# Patient Record
Sex: FEMALE | Race: WHITE | Employment: FULL TIME | URBAN - METROPOLITAN AREA
[De-identification: names, ages, dates, MRNs, and addresses within clinical notes are randomized per-mention and may not be internally consistent; named-entity substitution may affect disease eponyms.]

---

## 2017-06-12 RX ORDER — LEVONORGESTREL AND ETHINYL ESTRADIOL 0.1-0.02MG
1 KIT ORAL DAILY
Qty: 1 PACKAGE | Refills: 0 | Status: SHIPPED | OUTPATIENT
Start: 2017-06-12 | End: 2017-06-13

## 2017-06-13 ENCOUNTER — TELEPHONE (OUTPATIENT)
Dept: OBGYN CLINIC | Facility: CLINIC | Age: 37
End: 2017-06-13

## 2017-06-13 RX ORDER — LEVONORGESTREL AND ETHINYL ESTRADIOL 0.1-0.02MG
1 KIT ORAL DAILY
Qty: 1 PACKAGE | Refills: 2 | Status: SHIPPED | OUTPATIENT
Start: 2017-06-13 | End: 2017-07-24

## 2017-06-13 NOTE — TELEPHONE ENCOUNTER
Refill request for OCP sent faxed to the office. Pt's last annual exam was 01/04/2016. Pt needs annual exam. LM on voicemail that unable to refill rx at this time but to please call the office and make annual appointment.

## 2017-06-13 NOTE — TELEPHONE ENCOUNTER
Medication Detail         Disp Refills Start End        Levonorgestrel-Ethinyl Estrad (LUTERA) 0.1-20 MG-MCG Oral Tab 1 Package 2 6/13/2017       Sig - Route:  Take 1 tablet by mouth daily. - Oral      E-Prescribing Status: Receipt confirmed by pharmacy (6/

## 2017-07-24 ENCOUNTER — OFFICE VISIT (OUTPATIENT)
Dept: OBGYN CLINIC | Facility: CLINIC | Age: 37
End: 2017-07-24

## 2017-07-24 VITALS
WEIGHT: 246.81 LBS | DIASTOLIC BLOOD PRESSURE: 80 MMHG | SYSTOLIC BLOOD PRESSURE: 120 MMHG | HEIGHT: 65 IN | BODY MASS INDEX: 41.12 KG/M2

## 2017-07-24 DIAGNOSIS — E66.01 OBESITY, CLASS III, BMI 40-49.9 (MORBID OBESITY) (HCC): ICD-10-CM

## 2017-07-24 DIAGNOSIS — Z30.41 ENCOUNTER FOR SURVEILLANCE OF CONTRACEPTIVE PILLS: ICD-10-CM

## 2017-07-24 DIAGNOSIS — Z01.419 WOMEN'S ANNUAL ROUTINE GYNECOLOGICAL EXAMINATION: Primary | ICD-10-CM

## 2017-07-24 PROCEDURE — 99395 PREV VISIT EST AGE 18-39: CPT | Performed by: OBSTETRICS & GYNECOLOGY

## 2017-07-24 RX ORDER — LEVONORGESTREL AND ETHINYL ESTRADIOL 0.1-0.02MG
1 KIT ORAL DAILY
Qty: 90 TABLET | Refills: 3 | Status: SHIPPED | OUTPATIENT
Start: 2017-07-24 | End: 2018-07-16

## 2017-07-24 NOTE — PROGRESS NOTES
GYN H&P     2017  12:31 PM    CC:Patient presents for routine visit    HPI: Patient is a 39year old  here for annual gyn exam and OCP refill. Cycles light and scant. No pelvic pain. No abnormal vaginal discharge or bleeding.        Patient's la changes, pain, lumps or discharge   Psychiatric: no complaints  Endocrine:no complaints  Neurological: no complaints  Immunological: no complaints  Musculoskeletal:no complaints        /80   Ht 65\"   Wt 246 lb 12.8 oz   LMP 04/16/2017 (Approximate)

## 2018-07-16 RX ORDER — LEVONORGESTREL AND ETHINYL ESTRADIOL 0.1-0.02MG
1 KIT ORAL DAILY
Qty: 84 TABLET | Refills: 0 | Status: SHIPPED | OUTPATIENT
Start: 2018-07-16 | End: 2019-07-11

## 2018-07-17 RX ORDER — LEVONORGESTREL AND ETHINYL ESTRADIOL 0.1-0.02MG
1 KIT ORAL DAILY
Qty: 84 TABLET | Refills: 0 | Status: SHIPPED | OUTPATIENT
Start: 2018-07-17 | End: 2019-07-12